# Patient Record
Sex: FEMALE | Race: BLACK OR AFRICAN AMERICAN | Employment: FULL TIME | URBAN - METROPOLITAN AREA
[De-identification: names, ages, dates, MRNs, and addresses within clinical notes are randomized per-mention and may not be internally consistent; named-entity substitution may affect disease eponyms.]

---

## 2018-10-19 ENCOUNTER — HOSPITAL ENCOUNTER (OUTPATIENT)
Dept: PHYSICAL THERAPY | Age: 38
Discharge: HOME | End: 2018-10-19
Payer: COMMERCIAL

## 2018-10-19 PROCEDURE — 97110 THERAPEUTIC EXERCISES: CPT

## 2018-10-19 PROCEDURE — 97161 PT EVAL LOW COMPLEX 20 MIN: CPT

## 2018-10-19 NOTE — PROGRESS NOTES
[address] [facility] AT [address] [place] [address] Dr. ISABELL Olguin [address], [place], [address]  Phone: [phone]   [phone] PLAN OF CARE / STATEMENT OF MEDICAL NECESSITY FOR PHYSICAL THERAPY SERVICES Patient Name: Viral Gregory : 1980 Medical  
Diagnosis: Pain in right ankle [M25.571] Treatment Diagnosis: Pain in right ankle [M25.571] Onset Date: 18 Referral Source: Tania Collier [facility]): 10/19/2018 Prior Hospitalization: See medical history Provider #: [id number] Prior Level of Function: Independent w/ ADL's Comorbidities: NA Medications: Verified on Patient Summary List  
The Plan of Care and following information is based on the information from the initial evaluation.  
=========================================================================================== Assessment / key information:  Patient is a 33 y/o female presenting s/p ORIF of the right ankle for fibular fracture. Pt fell on 18 and had ORIF on 18. She progressed from 8 weeks NWB (scooter) and she currently presents in CAM walker which she has been walking in for 6 weeks. 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 She reports some stiffness with moving her ankle, but reports 0/10 pain walking in the boot. GAIT: Pt presents to therapy in right CAM walker. Without boot, pt demonstrates decreased R strides and increased toe out Ankle AROM: Left ankle +5 degrees DF, 70 PF, 50 INV, 10 EV; Right ankle -10 DF, 50 PF, 12 INV, 3 EV 
CIRCUMFERENCE: +3cm differential at malleolar level on right ankle PALPATION: Moderate TTP to right ATF ligament, achilles, distal fibula STRENGTH: 4-/5 isometrically all right ankle movements The patient was instructed in a home exercise program to address the above findings/deficits. The patient should benefit from therapy services to progress toward functional goals and improve quality of life. 
=========================================================================================== History LOW Complexity : Zero comorbidities / personal factors that will impact the outcome / POC; Examination MEDIUM Complexity : 3 Standardized tests and measures addressing body structure, function, activity limitation and / or participation in recreation ; Presentation MEDIUM Complexity : Evolving with changing characteristics ; Decision Making MEDIUM Complexity : FOTO score of 26-74; Complexity LOW Problem List: pain affecting function, decrease ROM, decrease strength, edema affecting function, impaired gait/ balance, decrease ADL/ functional abilitiies, decrease activity tolerance and decrease flexibility/ joint mobility Treatment Plan may include any combination of the following: Therapeutic exercise, Therapeutic activities, Neuromuscular re-education, Physical agent/modality, Gait/balance training, Manual therapy and Patient education Patient / Family readiness to learn indicated by: asking questions, trying to perform skills and interest 
Persons(s) to be included in education: patient (P) Barriers to Learning/Limitations: None Measures taken:   
Patient Goal (s): Full ROM/flexibility, walk without boot Patient self reported health status: excellent Rehabilitation Potential: good ? Short Term Goals: To be accomplished in  6  treatments: Independent/compliant with long term HEP for ROM, strength Improve active dorsiflexion ROM to 0 degrees or greater to improve gait Pt will be able to ambulate on even surface short/med distance with minimal gait deviation ? Long Term Goals: To be accomplished in  12  treatments: 
Pt will report 75% improvement with distance walking on even surface Patient will be able to climb/descend 1 flight of stairs reciprocally with demonstration of good control/safety Able to stand unsupported on involved leg for 15-20 seconds to demonstrate balance and limb stability Improve FOTO outcome score by 19% to indicate a significant improvement in ADL function Frequency / Duration:   Patient to be seen  2  times per week for 6  weeks: 
Patient / Caregiver education and instruction: activity modification, brace/ splint application and exercises G-Codes (GP): NA Therapist Signature: Azalia Tay PT, OCS Date: 10/19/2018 Certification Period: NA Time: 1:24 PM  
=========================================================================================== I certify that the above Physical Therapy Services are being furnished while the patient is under my care. I agree with the treatment plan and certify that this therapy is necessary. Physician Signature:       Date:      Time:  Please sign and return to [facility] or you may fax the signed copy to [phone]. Thank you.

## 2018-10-19 NOTE — PROGRESS NOTES
PT EVALUATION/ DAILY TREATMENT NOTE Patient Name: Ivan Blanco Date:10/19/2018 : 1980 [x]  Patient  Verified Payor: Young Hoff / Plan: [address] PT / Product Type: Commerical / In time:1:25  Out time:2:15 Total Treatment Time (min): 50 Total Timed Codes (min): 50 
1:1 Treatment Time ( [address] only):   
Visit #: 1 of 12 SUBJECTIVE Pain Level (0-10 scale): 0 See Plan of Care for subjective history and objective foot/ankle exam details OBJECTIVE TREATMENT: 
Modality (rationale):  
[]  E-Stim: type _ x _ min     []att   []unatt   []w/US   []w/ice   []w/heat 
[]  Ultrasound: []cont   []pulse    _ W/cm2 x _  min   []1MHz   []3MHz 
[]  Iontophoresis: []take home patch w/ dexamethazone    []40mA   []80mA 
                             []_ mA min w/: []dexamethazone   []other:_ 
[]  Ice pack _  min     [] Hot pack _  min     [] Paraffin _  min 
[]  Other:  
 
Therapeutic Exercise: [x] see flow sheet     [] Other:_ Added/Changed Exercises: 
[x]  Added:See HEP_  to improve (function): Foot/ankle mobility to improve gait 
[]  Changed:_ to improve (function): 
(minutes: 15) Other Objective/Functional Measures:  
Pain Level (0-10 scale) post treatment: 0 
 
ASSESSMENT[x]  See Plan of Care Patient is assigned a low evaluation complexity based upon presence of no comorbidities, FOTO score, and post-operative symptom characteristics. PLAN [x] Other:See Plan of Care_ Chucky Holt 10/19/2018  2:23 PM

## 2018-10-25 ENCOUNTER — HOSPITAL ENCOUNTER (OUTPATIENT)
Dept: PHYSICAL THERAPY | Age: 38
Discharge: HOME | End: 2018-10-25
Payer: COMMERCIAL

## 2018-10-25 PROCEDURE — 97140 MANUAL THERAPY 1/> REGIONS: CPT

## 2018-10-25 PROCEDURE — 97110 THERAPEUTIC EXERCISES: CPT

## 2018-10-25 NOTE — PROGRESS NOTES
PT DAILY TREATMENT NOTE  Patient Name: Faiza Ravi Date:10/25/2018 : 1980 [x]  Patient  Verified Payor: Wilfred Fee / Plan: [address] PT / Product Type: Commerical / In time:10:07  Out time:11:34 Total Treatment Time (min): 87 Total Timed Codes (min): 71 
1:1 Treatment Time (min):   
Visit #: 2 of 12 Treatment Area: Pain in right ankle [M25.571] SUBJECTIVE Pain Level (0-10 scale): 0 Any medication changes, allergies to medications, adverse drug reactions, diagnosis change, or new procedure performed?: [x] No    [] Yes (see summary sheet for update) Subjective functional status/changes:   [] No changes reported I have been trying to do half of my work day in the boot and the other half of the day in my normal shoe because the doctor wants me to try to ween and get away from wearing the boot as soon as I can. OBJECTIVE Modality rationale: decrease inflammation and decrease pain to improve the patients ability to improve functional abilities Type Additional Details  
[] Estim: []Att   []Unatt  []TENS instruct []IFC  []Premod []NMES                       []Other:  []w/US   []w/ice   []w/heat Position: Location:  
[]  Traction: [] Cervical       []Lumbar 
                     [] Prone          []Supine []Intermittent   []Continuous Lbs: 
[] before manual 
[] after manual  
[]  Ultrasound: []Continuous   [] Pulsed []1MHz   []3MHz Location: 
W/cm2:  
[]  Iontophoresis with dexamethasone Location: [] Take home patch  
[] In clinic [x]  Ice     []  heat 
[]  Ice massage Position:long sitting Location:right ankle  
[]  Vasopneumatic Device Pressure: [] lo [] med [] hi  
Temp: [] lo [] med [] hi  
[] Skin assessment post-treatment:  []intact []redness- no adverse reaction 
     []redness  adverse reaction:  
 
 
67 min Therapeutic Exercise:  [x] See flow sheet :  
 Rationale: increase ROM, increase strength, improve balance and increase proprioception to improve the patients ability to improve functional abilities 10 min Manual Therapy:  Retrograde massage/tissue mobs to right foot/ankle, ankle PROM in all planes and manual calf/heel cord stretching Rationale: increase ROM and increase tissue extensibility to improve functional mobility  
   
 min Patient Education: [x] Review HEP    [] Progressed/Changed HEP based on:  
[] positioning   [] body mechanics   [] transfers   [] heat/ice application Other Objective/Functional Measures:  
 
Pain Level (0-10 scale) post treatment: 0 
 
ASSESSMENT/Changes in Function: Pt tolerated all new therex well upon trial today with chief c/o limited inversion/eversion mobility which was apparent during therex today. Pt had the most difficulty with theraband inversion/eversion as well as with AAROM with seated BAPS board today. Pt also needs work on intrinsic foot mobility and digit dexterity. Will continue to progress/advance patient within current POC as tolerated with monitoring symptoms. Patient will continue to benefit from skilled PT services to modify and progress therapeutic interventions, address functional mobility deficits, address ROM deficits, address strength deficits, analyze and address soft tissue restrictions and analyze and cue movement patterns to attain remaining goals. []  See Plan of Care 
[]  See progress note/recertification 
[]  See Discharge Summary Progress towards goals / Updated goals: PLAN [x]  Upgrade activities as tolerated     []  Continue plan of care 
[]  Update interventions per flow sheet      
[]  Discharge due to:_ 
[]  Other:_ Otoniel Correa, INDERJIT 10/25/2018  10:24 AM

## 2018-10-26 ENCOUNTER — HOSPITAL ENCOUNTER (OUTPATIENT)
Dept: PHYSICAL THERAPY | Age: 38
Discharge: HOME | End: 2018-10-26
Payer: COMMERCIAL

## 2018-10-26 PROCEDURE — 97140 MANUAL THERAPY 1/> REGIONS: CPT

## 2018-10-26 PROCEDURE — 97110 THERAPEUTIC EXERCISES: CPT

## 2018-10-26 NOTE — PROGRESS NOTES
PT DAILY TREATMENT NOTE  Patient Name: Joby Pastrana Date:10/26/2018 : 1980 [x]  Patient  Verified Payor: Garry Penny / Plan: [address] PT / Product Type: Commerical / In time:8:50  Out time:9:40 Total Treatment Time (min): 50 Total Timed Codes (min): 50 
1:1 Treatment Time (min):   
Visit #: 3 of 12 Treatment Area: Pain in right ankle [M25.571] SUBJECTIVE Pain Level (0-10 scale): 0 Any medication changes, allergies to medications, adverse drug reactions, diagnosis change, or new procedure performed?: [x] No    [] Yes (see summary sheet for update) Subjective functional status/changes:   [] No changes reported Pt states she does not have pain today, just a cramping sensation periodically. She is going without the walking boot today. OBJECTIVE Modality rationale: Not done due to pt time constraint Min Type Additional Details  
 [] Estim: []Att   []Unatt  []TENS instruct []IFC  []Premod []NMES                       []Other:  []w/US   []w/ice   []w/heat Position: Location:  
 []  Traction: [] Cervical       []Lumbar 
                     [] Prone          []Supine []Intermittent   []Continuous Lbs: 
[] before manual 
[] after manual  
 []  Ultrasound: []Continuous   [] Pulsed []1MHz   []3MHz Location: 
W/cm2:  
 []  Iontophoresis with dexamethasone Location: [] Take home patch  
[] In clinic  
 []  Ice     []  heat 
[]  Ice massage Position: Location:  
 []  Vasopneumatic Device Pressure: [] lo [] med [] hi  
Temp: [] lo [] med [] hi  
[] Skin assessment post-treatment:  []intact []redness- no adverse reaction 
     []redness  adverse reaction:  
 
 
40 min Therapeutic Exercise:  [] See flow sheet :  
Rationale: increase ROM, increase strength, improve coordination and increase proprioception to improve the patients ability to perform gait and other dynamic movement activities 10 min Manual Therapy:  PROM of the right ankle in all planes Rationale: increase ROM and increase tissue extensibility to improve the patient's ability to perform gait and other dynamic movement activities Pain Level (0-10 scale) post treatment: 1.5 ASSESSMENT/Changes in Function: Pt exhibits improved overall ankle AROM and active toe mobility today. Added sharpened Romberg balance as well for balance and stability, pt was moderately challenged with this activity. Patient will continue to benefit from skilled PT services to modify and progress therapeutic interventions, address functional mobility deficits, address ROM deficits, address strength deficits and analyze and cue movement patterns to attain remaining goals. PLAN [x]  Upgrade activities as tolerated     []  Continue plan of care 
[]  Update interventions per flow sheet      
[]  Discharge due to:_ 
[]  Other:_ Chucky Borja 10/26/2018  8:03 AM

## 2018-11-01 ENCOUNTER — HOSPITAL ENCOUNTER (OUTPATIENT)
Dept: PHYSICAL THERAPY | Age: 38
Discharge: HOME | End: 2018-11-01
Payer: COMMERCIAL

## 2018-11-01 PROCEDURE — 97140 MANUAL THERAPY 1/> REGIONS: CPT

## 2018-11-01 PROCEDURE — 97110 THERAPEUTIC EXERCISES: CPT

## 2018-11-01 NOTE — PROGRESS NOTES
PT DAILY TREATMENT NOTE  Patient Name: Rama Lomas Date:2018 : 1980 [x]  Patient  Verified Payor: John Led / Plan: [address] [place] [address] Juan PT / Product Type: Commerical / In time:3:25  Out time:4:30 Total Treatment Time (min): 65 Total Timed Codes (min): 65 
1:1 Treatment Time (min): 65 Visit #: 4 of 12 Treatment Area: Pain in right ankle [M25.571] SUBJECTIVE Pain Level (0-10 scale): 2 Any medication changes, allergies to medications, adverse drug reactions, diagnosis change, or new procedure performed?: [x] No    [] Yes (see summary sheet for update) Subjective functional status/changes:   [] No changes reported Pt reports she is frustrated with the increased swelling going back to work, and wearing shoes is difficult. OBJECTIVE Modality rationale: decrease edema and decrease pain to improve the patients ability to perform gait and other dynamic movement activities Type Additional Details  
[] Estim: []Att   []Unatt  []TENS instruct []IFC  []Premod []NMES                       []Other:  []w/US   []w/ice   []w/heat Position: Location:  
[]  Traction: [] Cervical       []Lumbar 
                     [] Prone          []Supine []Intermittent   []Continuous Lbs: 
[] before manual 
[] after manual  
[]  Ultrasound: []Continuous   [] Pulsed []1MHz   []3MHz Location: 
W/cm2:  
[]  Iontophoresis with dexamethasone Location: [] Take home patch  
[] In clinic  
[]  Ice     []  heat 
[]  Ice massage Position: Location:  
[x]  Vasopneumatic Device- R ankle- 10 minutes  
[] Skin assessment post-treatment:  []intact []redness- no adverse reaction 
     []redness  adverse reaction:  
 
 
43 min Therapeutic Exercise:  [] See flow sheet :  
Rationale: increase ROM, increase strength, improve coordination and increase proprioception to improve the patients ability to perform gait and other dynamic movement activities 12 min Manual Therapy:  Right ankle joint PROM and Graston technique to medial and lateral ankle tendons Rationale: increase ROM and increase tissue extensibility to improve the patient's ability to perform gait and other dynamic movement activities Pain Level (0-10 scale) post treatment: 1 ASSESSMENT/Changes in Function: Pt presents with mild increased swelling and antalgic gait today. Explained that this was not unusual due to increased walking and time on feet while being out of the boot. Pt was advanced to standing calf stretches, which should help to further improve ankle push-off and gait quality. Patient will continue to benefit from skilled PT services to modify and progress therapeutic interventions, address ROM deficits, address strength deficits and analyze and cue movement patterns to attain remaining goals. []  See Plan of Care 
[]  See progress note/recertification 
[]  See Discharge Summary PLAN [x]  Upgrade activities as tolerated     []  Continue plan of care 
[]  Update interventions per flow sheet      
[]  Discharge due to:_ 
[]  Other:_ [facility], [place] 11/1/2018  11:04 AM

## 2018-11-02 ENCOUNTER — HOSPITAL ENCOUNTER (OUTPATIENT)
Dept: PHYSICAL THERAPY | Age: 38
Discharge: HOME | End: 2018-11-02
Payer: COMMERCIAL

## 2018-11-02 PROCEDURE — 97110 THERAPEUTIC EXERCISES: CPT

## 2018-11-02 PROCEDURE — 97016 VASOPNEUMATIC DEVICE THERAPY: CPT

## 2018-11-02 PROCEDURE — 97140 MANUAL THERAPY 1/> REGIONS: CPT

## 2018-11-02 NOTE — PROGRESS NOTES
PT DAILY TREATMENT NOTE  Patient Name: Michelle Judge Date:2018 : 1980 [x]  Patient  Verified Payor: Lloyd Bottom / Plan: [address] [place] [address] Rd PT / Product Type: Commerical / In time: 9:26  Out time: 10:31 Total Treatment Time (min): 65 Total Timed Codes (min): 65 
1:1 Treatment Time (min): N/A Visit #: 5 of 12 Treatment Area: Pain in right ankle [M25.571] SUBJECTIVE Pain Level (0-10 scale): 2-3/10 Any medication changes, allergies to medications, adverse drug reactions, diagnosis change, or new procedure performed?: [x] No    [] Yes (see summary sheet for update) Subjective functional status/changes:   [] No changes reported This week I have been up and around a work a lot more and it's been like a balloon, so it's been difficult to walk. He worked most of the inflammation out yesterday but as soon as I left it came back. I got compression stockings last night. The doctor had given me a lace up brace but it was uncomfortable and restricted movement too much and did not fit in shoes. OBJECTIVE Modality rationale: decrease edema and decrease pain to improve the patients ability to perform gait and other dynamic movement activities Min Type Additional Details  
 [] Estim: []Att   []Unatt        []TENS instruct []IFC  []Premod   []NMES []Other:  []w/US   []w/ice   []w/heat Position: Location:  
 []  Traction: [] Cervical       []Lumbar 
                     [] Prone          []Supine []Intermittent   []Continuous Lbs: 
[] before manual 
[] after manual  
 []  Ultrasound: []Continuous   [] Pulsed []1MHz   []3MHz Location: 
W/cm2:  
 []  Iontophoresis with dexamethasone Location: [] Take home patch  
[] In clinic  
 []  Ice     []  heat 
[]  Ice massage Position: Location:  
10 [x]  Vasopneumatic Device Pressure:       [x] lo [] med [] hi  
Temperature: [x] lo [] med [] hi  
 [x] Skin assessment post-treatment:  [x]intact []redness- no adverse reaction 
     []redness  adverse reaction:  
 
45 min Therapeutic Exercise:  [x] See flow sheet :  
Rationale: increase ROM, increase strength, improve coordination and increase proprioception to improve the patients ability to perform gait and other dynamic movement activities 10 min Manual Therapy:  Right ankle joint PROM and DTM/STM to medial and lateral ankle tendons Rationale: increase ROM and increase tissue extensibility to improve the patient's ability to perform gait and other dynamic movement activities With 
 [x] TE 
 [] TA 
 [] neuro 
 [] other: Patient Education: [x] Review HEP [] Progressed/Changed HEP based on:  
[] positioning   [] body mechanics   [] transfers   [x] heat/ice application   
[x] other: RICE  
  
Other Objective/Functional Measures:  
- Patient was 11 minutes late - Progressed to 1 Full Bowl for marble pickup - Progressed to L5 on TG HR/TR 
- Progressed to GTB for ankle TB 4-way - Progressed to YMB with side stepping Pain Level (0-10 scale) post treatment: 0-1/10 ASSESSMENT/Changes in Function: Pt presents with continued mild swelling today. She tolerated progression of therapeutic exercise well without increase in pain or swelling. Patient educated on RICE on a consistent basis to aid with decreasing swelling especially since she reports increased ambulation this week. Patient will continue to benefit from skilled PT services to modify and progress therapeutic interventions, address ROM deficits, address strength deficits and analyze and cue movement patterns to attain remaining goals. [x]  See Plan of Care 
[]  See progress note/recertification 
[]  See Discharge Summary Progress towards goals / Updated goals: · Short Term Goals: To be accomplished in  6  treatments: Independent/compliant with long term HEP for ROM, strength. MET 11/2/18 Improve active dorsiflexion ROM to 0 degrees or greater to improve gait Pt will be able to ambulate on even surface short/med distance with minimal gait deviation · Long Term Goals: To be accomplished in  12  treatments: 
Pt will report 75% improvement with distance walking on even surface Patient will be able to climb/descend 1 flight of stairs reciprocally with demonstration of good control/safety Able to stand unsupported on involved leg for 15-20 seconds to demonstrate balance and limb stability Improve FOTO outcome score by 19% to indicate a significant improvement in ADL function PLAN [x]  Upgrade activities as tolerated     [x]  Continue plan of care 
[]  Update interventions per flow sheet      
[]  Discharge due to:_ 
[]  Other:_   
 
KAITLIN Steel 11/2/2018  10:31 AM

## 2018-11-05 ENCOUNTER — HOSPITAL ENCOUNTER (OUTPATIENT)
Dept: PHYSICAL THERAPY | Age: 38
Discharge: HOME | End: 2018-11-05
Payer: COMMERCIAL

## 2018-11-05 PROCEDURE — 97016 VASOPNEUMATIC DEVICE THERAPY: CPT

## 2018-11-05 PROCEDURE — 97110 THERAPEUTIC EXERCISES: CPT

## 2018-11-05 PROCEDURE — 97140 MANUAL THERAPY 1/> REGIONS: CPT

## 2018-11-05 NOTE — PROGRESS NOTES
PT DAILY TREATMENT NOTE  Patient Name: Fatoumata Baptiste Date:2018 : 1980 [x]  Patient  Verified Payor: Devi Turner / Plan: [address] [place] [address] Rd PT / Product Type: Commerical / In time:10:05  Out time:11:23 Total Treatment Time (min): 78 Total Timed Codes (min): 62 
1:1 Treatment Time (min):   
Visit #: 6 of 12 Treatment Area: Pain in right ankle [M25.571] SUBJECTIVE Pain Level (0-10 scale): 2-3/10 Any medication changes, allergies to medications, adverse drug reactions, diagnosis change, or new procedure performed?: [x] No    [] Yes (see summary sheet for update) Subjective functional status/changes:   [] No changes reported I have been wearing a compression stocking for the past couple of days which has helped control some of the swelling, but it hasn't really helped with the pain that I feel on the inside of my ankle. OBJECTIVE Modality rationale: decrease edema, decrease inflammation and decrease pain to improve the patients ability to improve functional abilities Type Additional Details  
[] Estim: []Att   []Unatt  []TENS instruct []IFC  []Premod []NMES                       []Other:  []w/US   []w/ice   []w/heat Position: Location:  
[]  Traction: [] Cervical       []Lumbar 
                     [] Prone          []Supine []Intermittent   []Continuous Lbs: 
[] before manual 
[] after manual  
[]  Ultrasound: []Continuous   [] Pulsed []1MHz   []3MHz Location: 
W/cm2:  
[]  Iontophoresis with dexamethasone Location: [] Take home patch  
[] In clinic  
[]  Ice     []  heat 
[]  Ice massage Position: Location:  
[x]  Vasopneumatic Device Pressure: [x] lo [] med [] hi  
Temp: [x] lo [] med [] hi  
[] Skin assessment post-treatment:  []intact []redness- no adverse reaction 
     []redness  adverse reaction:  
 
 
58 min Therapeutic Exercise:  [x] See flow sheet :  
 Rationale: increase ROM, increase strength, improve balance and increase proprioception to improve the patients ability to improve functional abilities 10 min Manual Therapy:  Retrograde massage/tissue mobs to right foot/ankle Rationale: increase ROM and increase tissue extensibility to improve functional mobility 
       
 min Patient Education: [x] Review HEP    [] Progressed/Changed HEP based on:  
[] positioning   [] body mechanics   [] transfers   [] heat/ice application Other Objective/Functional Measures:  
 
Pain Level (0-10 scale) post treatment: 0 
 
ASSESSMENT/Changes in Function: Patient reports approximately 30% overall improvement from therapy since initial evaluation. Pt was also able to tolerate increased resistance with mini band side steps with min to mod challenge today. Will continue to progress/advance patient within current POC as tolerated with monitoring symptoms. Patient will continue to benefit from skilled PT services to modify and progress therapeutic interventions, address functional mobility deficits, address ROM deficits, address strength deficits, analyze and address soft tissue restrictions and analyze and cue movement patterns to attain remaining goals. []  See Plan of Care 
[]  See progress note/recertification 
[]  See Discharge Summary Progress towards goals / Updated goals: PLAN [x]  Upgrade activities as tolerated     []  Continue plan of care 
[]  Update interventions per flow sheet      
[]  Discharge due to:_ 
[]  Other:_ Marquis Fournier, INDERJIT 11/5/2018  10:08 AM

## 2018-11-08 ENCOUNTER — HOSPITAL ENCOUNTER (OUTPATIENT)
Dept: PHYSICAL THERAPY | Age: 38
Discharge: HOME | End: 2018-11-08
Payer: COMMERCIAL

## 2018-11-08 PROCEDURE — 97140 MANUAL THERAPY 1/> REGIONS: CPT

## 2018-11-08 PROCEDURE — 97110 THERAPEUTIC EXERCISES: CPT

## 2018-11-08 NOTE — PROGRESS NOTES
PT DAILY TREATMENT NOTE  Patient Name: Adrian Hester Date:2018 : 1980 [x]  Patient  Verified Payor: Kim Randolph / Plan: [address] [place] [address] Rd PT / Product Type: Commerical / In time:5:00  Out time:6:00 Total Treatment Time (min): 60 Total Timed Codes (min): 60 
1:1 Treatment Time (min):   
Visit #: 7 of 12 Treatment Area: Pain in right ankle [M25.571] SUBJECTIVE Pain Level (0-10 scale): 0 Any medication changes, allergies to medications, adverse drug reactions, diagnosis change, or new procedure performed?: [x] No    [] Yes (see summary sheet for update) Subjective functional status/changes:   [] No changes reported Pt reports her ankle is doing a lot better this week swelling-wise, and also she got an ASO brace which seems to be helping too. OBJECTIVE Modality rationale: decrease edema and decrease pain to improve the patients ability to perform gait and other dynamic movement activities Min Type Additional Details  
 [] Estim: []Att   []Unatt  []TENS instruct []IFC  []Premod []NMES                       []Other:  []w/US   []w/ice   []w/heat Position: Location:  
 []  Traction: [] Cervical       []Lumbar 
                     [] Prone          []Supine []Intermittent   []Continuous Lbs: 
[] before manual 
[] after manual  
 []  Ultrasound: []Continuous   [] Pulsed []1MHz   []3MHz Location: 
W/cm2:  
 []  Iontophoresis with dexamethasone Location: [] Take home patch  
[] In clinic  
 []  Ice     []  heat 
[]  Ice massage Position: Location:  
10 [x]  Vasopneumatic Device Pressure: [] lo [x] med [] hi  
Temp: [x] lo [] med [] hi  
[] Skin assessment post-treatment:  []intact []redness- no adverse reaction 
     []redness  adverse reaction:  
 
 
40 min Therapeutic Exercise:  [] See flow sheet :  
Rationale: increase ROM, increase strength, improve balance and increase proprioception to improve the patients ability to perform gait and other dynamic movement activities 10 min Manual Therapy: Ankle PROM in all directions, Instrument assisted soft tissue mobilization applied to med/lat ankle using GT-3 and GT-4 instruments Rationale: increase ROM and increase tissue extensibility to improve gait mechanics Pain Level (0-10 scale) post treatment: 0 
 
ASSESSMENT/Changes in Function: Pt presented with moderate decrease in swelling today. Still exhibiting moderate gait deficit on even surface. Reviewed self PROM to ankle in sitting position for Patient will continue to benefit from skilled PT services to address functional mobility deficits, address ROM deficits, address strength deficits and analyze and cue movement patterns to attain remaining goals. PLAN 
[]  Upgrade activities as tolerated     []  Continue plan of care 
[]  Update interventions per flow sheet      
[]  Discharge due to:_ 
[]  Other:_ Chucky Clay 11/8/2018  6:11 PM

## 2018-11-15 ENCOUNTER — HOSPITAL ENCOUNTER (OUTPATIENT)
Dept: PHYSICAL THERAPY | Age: 38
Discharge: HOME | End: 2018-11-15
Payer: COMMERCIAL

## 2018-11-15 PROCEDURE — 97110 THERAPEUTIC EXERCISES: CPT

## 2018-11-15 PROCEDURE — 97140 MANUAL THERAPY 1/> REGIONS: CPT

## 2018-11-15 NOTE — PROGRESS NOTES
PT DAILY TREATMENT NOTE  Patient Name: Kishan Richardson Date:11/15/2018 : 1980 [x]  Patient  Verified Payor: Steve Tian / Plan: [address] [place] [address] Rd PT / Product Type: Commerical / In time:9:26  Out time:10:47 Total Treatment Time (min): 81 Total Timed Codes (min): 65 
1:1 Treatment Time (min):   
Visit #: 8 of 12 Treatment Area: Pain in right ankle [M25.571] SUBJECTIVE Pain Level (0-10 scale): 0 Any medication changes, allergies to medications, adverse drug reactions, diagnosis change, or new procedure performed?: [x] No    [] Yes (see summary sheet for update) Subjective functional status/changes:   [] No changes reported I don't really have any pain in my ankle, it just feels stiff especially when I am bending over my foot when I am walking. OBJECTIVE Modality rationale: decrease inflammation and decrease pain to improve the patients ability to improve functional abilities Min Type Additional Details  
 [] Estim: []Att   []Unatt  []TENS instruct []IFC  []Premod []NMES                       []Other:  []w/US   []w/ice   []w/heat Position: Location:  
 []  Traction: [] Cervical       []Lumbar 
                     [] Prone          []Supine []Intermittent   []Continuous Lbs: 
[] before manual 
[] after manual  
 []  Ultrasound: []Continuous   [] Pulsed []1MHz   []3MHz Location: 
W/cm2:  
 []  Iontophoresis with dexamethasone Location: [] Take home patch  
[] In clinic [x]  Ice     []  heat 
[]  Ice massage Position:long sitting Location:left ankle  
 []  Vasopneumatic Device Pressure: [] lo [] med [] hi  
Temp: [] lo [] med [] hi  
[] Skin assessment post-treatment:  []intact []redness- no adverse reaction 
     []redness  adverse reaction:  
 
 
61 min Therapeutic Exercise:  [x] See flow sheet :  
Rationale: increase ROM, increase strength, improve balance and increase proprioception to improve the patients ability to improve functional abilities 10 min Manual Therapy:  Retrograde massage/tissue mobs to right foot/ankle Rationale: increase ROM and increase tissue extensibility to improve functional mobility  
       
 min Patient Education: [x] Review HEP    [] Progressed/Changed HEP based on:  
[] positioning   [] body mechanics   [] transfers   [] heat/ice application Other Objective/Functional Measures:  
 
Pain Level (0-10 scale) post treatment: 0 
 
ASSESSMENT/Changes in Function: Pt presented with chief c/o limited active dorsiflexion AROM with ambulation which was addressed with addition of step dorsiflexion stretches today. Pt was also able to advance to addition of SLS on level surface and STAR taps as well as increasing to level 2 with standing BAPS exercises with min to mod challenge with proprioceptive/balance awareness today. Will continue to progress/advance patient within current POC as tolerated with monitoring symptoms. Patient will continue to benefit from skilled PT services to modify and progress therapeutic interventions, address functional mobility deficits, address ROM deficits, address strength deficits, analyze and address soft tissue restrictions and analyze and cue movement patterns to attain remaining goals. []  See Plan of Care 
[]  See progress note/recertification 
[]  See Discharge Summary Progress towards goals / Updated goals: PLAN [x]  Upgrade activities as tolerated     []  Continue plan of care 
[]  Update interventions per flow sheet      
[]  Discharge due to:_ 
[]  Other:_ Bairon Bella PTA 11/15/2018  9:31 AM

## 2018-11-19 ENCOUNTER — HOSPITAL ENCOUNTER (OUTPATIENT)
Dept: PHYSICAL THERAPY | Age: 38
Discharge: HOME | End: 2018-11-19
Payer: COMMERCIAL

## 2018-11-19 PROCEDURE — 97140 MANUAL THERAPY 1/> REGIONS: CPT

## 2018-11-19 PROCEDURE — 97110 THERAPEUTIC EXERCISES: CPT

## 2018-11-19 NOTE — PROGRESS NOTES
[facility] AT [address] [place] [address] Dr. IS[address]ELL [address], [place], [address] [place] [address] Road  Phone: [phone]   [phone] PROGRESS NOTE Patient Name: Gigi New : 1980 Treatment/Medical Diagnosis: Pain in right ankle [M25.571] Referral Source: Ollie Bang DPM    
Date of Initial Visit: 10/19/18 Attended Visits: 9 Missed Visits: 1 SUMMARY OF TREATMENT Gait training, therapeutic exercise for ROM/strength, balance/proprioceptive training, manual therapy to right foot/ankle, pain modalities, HEP 
CURRENT STATUS Patient has attended 9 therapy sessions s/p right ankle ORIF and reports 60% improvement. She reports her pain, gait and swelling have all improved. She reports average pain ranging between 0-2/10. Remaining functional deficits include difficulty descending stairs without relying on railing, difficulty wearing low cut shoes, and inability to wear heeled shoes. Right ankle AROM: +2 degrees DF, 50 degrees PF, 18 degrees INV, 8 degrees EV Strength: 4/5 INV/EV, 4-/5 PF, 4+/5 DF · Short Term Goals: To be accomplished in  6  treatments: Independent/compliant with long term HEP for ROM, strength- Met and ongoing Improve active dorsiflexion ROM to 0 degrees or greater to improve gait- Met, +2 degrees Pt will be able to ambulate on even surface short/med distance with minimal gait deviation- Met, minimal deviation noted · Long Term Goals: To be accomplished in  12  treatments: 
Pt will report 75% improvement with distance walking on even surface- Not met, progressing Patient will be able to climb/descend 1 flight of stairs reciprocally with demonstration of good control/safety- Progressing, eccentric control deficits with descending stairs Able to stand unsupported on involved leg for 15-20 seconds to demonstrate balance and limb stability- Met at least 50% of the time Improve FOTO outcome score by 19% to indicate a significant improvement in ADL function- Not met, improved 11% so far RECOMMENDATIONS Continue therapy 2x/week for 4 weeks to progress toward the above functional goals. If you have any questions/comments please contact us directly at [phone]. Thank you for allowing us to assist in the care of your patient. Therapist Signature: Kristine Mireles PT, SAHIL Date: 11/19/2018 Time: 2:42 PM  
NOTE TO PHYSICIAN:  PLEASE COMPLETE THE ORDERS BELOW AND [facility] UNIT: [phone]. If you are unable to process this request in 24 hours please contact our office: [phone]. 
 
___ I have read the above report and request that my patient continue as recommended.  
___ I have read the above report and request that my patient continue therapy with the following changes/special instructions:_________________________________________________________  
___ I have read the above report and request that my patient be discharged from therapy.   
 
Physician Signature:       Date:      Time:

## 2018-11-19 NOTE — PROGRESS NOTES
PT DAILY TREATMENT NOTE  Patient Name: Meng Patch Date:2018 : 1980 [x]  Patient  Verified Payor: Christen Villa / Plan: [address] [place] [address] Rd PT / Product Type: Commerical / In time:5:07  Out time:6:03 Total Treatment Time (min): 56 Total Timed Codes (min): 56 
1:1 Treatment Time (min):   
Visit #: 9 of 12 Treatment Area: Pain in right ankle [M25.571] SUBJECTIVE Pain Level (0-10 scale): 0 Any medication changes, allergies to medications, adverse drug reactions, diagnosis change, or new procedure performed?: [x] No    [] Yes (see summary sheet for update) Subjective functional status/changes:   [] No changes reported See Progress Note OBJECTIVE Modality rationale: Pt deferred Type Additional Details  
[] Estim: []Att   []Unatt        []TENS instruct []IFC  []Premod   []NMES []Other:  []w/US   []w/ice   []w/heat Position: Location:  
[]  Traction: [] Cervical       []Lumbar 
                     [] Prone          []Supine []Intermittent   []Continuous Lbs: 
[] before manual 
[] after manual  
[]  Ultrasound: []Continuous   [] Pulsed []1MHz   []3MHz Location: 
W/cm2:  
[]  Iontophoresis with dexamethasone Location: [] Take home patch  
[] In clinic  
[]  Ice     []  heat 
[]  Ice massage Position: Location:  
[]  Vasopneumatic Device Pressure:       [] lo [] med [] hi  
Temperature: [] lo [] med [] hi  
[] Skin assessment post-treatment:  []intact []redness- no adverse reaction 
     []redness  adverse reaction:  
 
 
46 min Therapeutic Exercise:  [] See flow sheet :  
Rationale: increase ROM, increase strength, improve balance and increase proprioception to improve the patients ability to perform gait and other dynamic movement activities 10 min Manual Therapy:  Graston therapy to right achilles, PROM of the ankle and 1st toe Rationale: increase ROM and increase tissue extensibility to improve the patient's ability to perform gait and other dynamic movement activities Pain Level (0-10 scale) post treatment: 1 ASSESSMENT/Changes in Function:  
Patient will continue to benefit from skilled PT services to address functional mobility deficits, address ROM deficits, address strength deficits and analyze and cue movement patterns to attain remaining goals. []  See Plan of Care [x]  See progress note/recertification 
[]  See Discharge Summary PLAN 
[]  Upgrade activities as tolerated     [x]  Continue plan of care 
[]  Update interventions per flow sheet      
[]  Discharge due to:_ 
[]  Other:_ Chucky Shine 11/19/2018  3:03 PM

## 2018-11-21 ENCOUNTER — HOSPITAL ENCOUNTER (OUTPATIENT)
Dept: PHYSICAL THERAPY | Age: 38
Discharge: HOME | End: 2018-11-21
Payer: COMMERCIAL

## 2018-11-21 PROCEDURE — 97110 THERAPEUTIC EXERCISES: CPT

## 2018-11-21 NOTE — PROGRESS NOTES
PT DAILY TREATMENT NOTE  Patient Name: Joby Pastrana Date:2018 : 1980 [x]  Patient  Verified Payor: Garry Penny / Plan: [address] [place] [address] Rd PT / Product Type: Commerical / In time:10:38  Out time:11:30 Total Treatment Time (min): 52 Total Timed Codes (min): 36 
1:1 Treatment Time (min):   
Visit #: 10 of 12 Treatment Area: Pain in right ankle [M25.571] SUBJECTIVE Pain Level (0-10 scale): 0 Any medication changes, allergies to medications, adverse drug reactions, diagnosis change, or new procedure performed?: [x] No    [] Yes (see summary sheet for update) Subjective functional status/changes:   [] No changes reported My ankle is stiff, but I really don't have any pain in it today. OBJECTIVE Modality rationale: decrease inflammation and decrease pain to improve the patients ability to improve functional abilities Type Additional Details  
[] Estim: []Att   []Unatt  []TENS instruct []IFC  []Premod []NMES                       []Other:  []w/US   []w/ice   []w/heat Position: Location:  
[]  Traction: [] Cervical       []Lumbar 
                     [] Prone          []Supine []Intermittent   []Continuous Lbs: 
[] before manual 
[] after manual  
[]  Ultrasound: []Continuous   [] Pulsed []1MHz   []3MHz Location: 
W/cm2:  
[]  Iontophoresis with dexamethasone Location: [] Take home patch  
[] In clinic [x]  Ice     []  heat 
[]  Ice massage Position:long sitting Location:right ankle/foot  
[]  Vasopneumatic Device Pressure: [] lo [] med [] hi  
Temp: [] lo [] med [] hi  
[] Skin assessment post-treatment:  []intact []redness- no adverse reaction 
     []redness  adverse reaction:  
 
 
42 min Therapeutic Exercise:  [x] See flow sheet :  
Rationale: increase ROM, increase strength, improve balance and increase proprioception to improve the patients ability to improve functional abilities min Patient Education: [x] Review HEP    [] Progressed/Changed HEP based on:  
[] positioning   [] body mechanics   [] transfers   [] heat/ice application Other Objective/Functional Measures:  
 
Pain Level (0-10 scale) post treatment: 0 
 
ASSESSMENT/Changes in Function: Pt presents with the most functional improvement with decreased reported edema with prolonged standing ADL's since last tx. Pt was also able to tolerate increased resistance with mini band side steps with min to mod challenge today. Will continue to progress/advance patient within current POC as tolerated with monitoring symptoms. Patient will continue to benefit from skilled PT services to modify and progress therapeutic interventions, address functional mobility deficits, address ROM deficits, address strength deficits and analyze and cue movement patterns to attain remaining goals. []  See Plan of Care 
[]  See progress note/recertification 
[]  See Discharge Summary Progress towards goals / Updated goals: PLAN [x]  Upgrade activities as tolerated     []  Continue plan of care 
[]  Update interventions per flow sheet      
[]  Discharge due to:_ 
[]  Other:_ Boby Márquez PTA 11/21/2018  10:40 AM

## 2018-11-27 ENCOUNTER — HOSPITAL ENCOUNTER (OUTPATIENT)
Dept: PHYSICAL THERAPY | Age: 38
Discharge: HOME | End: 2018-11-27
Payer: COMMERCIAL

## 2018-11-27 PROCEDURE — 97140 MANUAL THERAPY 1/> REGIONS: CPT

## 2018-11-27 PROCEDURE — 97110 THERAPEUTIC EXERCISES: CPT

## 2018-11-27 NOTE — PROGRESS NOTES
PT DAILY TREATMENT NOTE  Patient Name: Cinthia Bueno Date:2018 : 1980 [x]  Patient  Verified Payor: Mima Rosario / [address] / Product Type: Commerical / In time:9:21  Out time:10:19 Total Treatment Time (min): 58 Total Timed Codes (min): 52 
1:1 Treatment Time (min):   
Visit #: 11 of 12 Treatment Area: Pain in right ankle [M25.571] SUBJECTIVE Pain Level (0-10 scale): 0 Any medication changes, allergies to medications, adverse drug reactions, diagnosis change, or new procedure performed?: [x] No    [] Yes (see summary sheet for update) Subjective functional status/changes:   [] No changes reported My ankle has mainly just been stiff lately, but I was poking around on my ankle over the weekend and I could feel one of the screws which freaked me out a little bit. OBJECTIVE 48 min Therapeutic Exercise:  [x] See flow sheet :  
Rationale: increase ROM, increase strength, improve balance and increase proprioception to improve the patients ability to improve functional abilities 10 min Manual Therapy:  Instrument assisted soft tissue mobilization applied to dorsal foot using GT-3, great toe and metatarsal joint mobs Rationale: increase ROM and increase tissue extensibility to improve functional mobility  
    
 min Patient Education: [x] Review HEP    [] Progressed/Changed HEP based on:  
[] positioning   [] body mechanics   [] transfers   [] heat/ice application Other Objective/Functional Measures:  
 
Pain Level (0-10 scale) post treatment: 0 
 
ASSESSMENT/Changes in Function: Pt presented with a palpable screw head near the distal region of incision today, which she was told to keep an eye on. Pt although presented with fairly good talocrural A>P mobility with addition of mobilization with manual intervention today. Will continue to progress/advance patient within current POC as tolerated with monitoring symptoms. Patient will continue to benefit from skilled PT services to modify and progress therapeutic interventions, address functional mobility deficits, address ROM deficits, address strength deficits, analyze and address soft tissue restrictions and analyze and cue movement patterns to attain remaining goals. []  See Plan of Care 
[]  See progress note/recertification 
[]  See Discharge Summary Progress towards goals / Updated goals: PLAN [x]  Upgrade activities as tolerated     []  Continue plan of care 
[]  Update interventions per flow sheet      
[]  Discharge due to:_ 
[]  Other:_ Susan Torrez PTA 11/27/2018  9:24 AM

## 2018-11-30 ENCOUNTER — HOSPITAL ENCOUNTER (OUTPATIENT)
Dept: PHYSICAL THERAPY | Age: 38
Discharge: HOME | End: 2018-11-30
Payer: COMMERCIAL

## 2018-11-30 PROCEDURE — 97140 MANUAL THERAPY 1/> REGIONS: CPT

## 2018-11-30 PROCEDURE — 97110 THERAPEUTIC EXERCISES: CPT

## 2018-11-30 NOTE — PROGRESS NOTES
PT DAILY TREATMENT NOTE  Patient Name: Cinthia Bueno Date:2018 : 1980 [x]  Patient  Verified Payor: Mima Rosario / Plan: [address] [place] [address] Juan PT / Product Type: Commerical / In time:10:33  Out time:11:38 Total Treatment Time (min): 65 Total Timed Codes (min): 55 
1:1 Treatment Time (min):   
Visit #: 12 of 16 Treatment Area: Pain in right ankle [M25.571] SUBJECTIVE Pain Level (0-10 scale): 0 Any medication changes, allergies to medications, adverse drug reactions, diagnosis change, or new procedure performed?: [x] No    [] Yes (see summary sheet for update) Subjective functional status/changes:   [] No changes reported See Progress Note OBJECTIVE Modality rationale: decrease edema and decrease pain to improve the patients ability to perform gait and other dynamic movement activities Min Type Additional Details  
 [] Estim: []Att   []Unatt        []TENS instruct []IFC  []Premod   []NMES []Other:  []w/US   []w/ice   []w/heat Position: Location:  
 []  Traction: [] Cervical       []Lumbar 
                     [] Prone          []Supine []Intermittent   []Continuous Lbs: 
[] before manual 
[] after manual  
 []  Ultrasound: []Continuous   [] Pulsed []1MHz   []3MHz Location: 
W/cm2:  
 []  Iontophoresis with dexamethasone Location: [] Take home patch  
[] In clinic  
10 [x]  Ice     []  heat 
[]  Ice massage Position: Seated Location: Right ankle  
 []  Vasopneumatic Device Pressure:       [] lo [] med [] hi  
Temperature: [] lo [] med [] hi  
[] Skin assessment post-treatment:  []intact []redness- no adverse reaction 
     []redness  adverse reaction:  
 
 
45 min Therapeutic Exercise:  [] See flow sheet :  
Rationale: increase ROM, increase strength, improve balance and increase proprioception to improve the patients ability to perform gait and other dynamic movement activities 10 min Manual Therapy:  Gr 3 right ankle DF mob's, PROM for inversion Rationale: increase ROM and increase tissue extensibility to improve the patient's ability to perform gait and other dynamic movement activities Pain Level (0-10 scale) post treatment: 0 
 
ASSESSMENT/Changes in Function:  
Patient will continue to benefit from skilled PT services to modify and progress therapeutic interventions, address functional mobility deficits, address strength deficits and analyze and cue movement patterns to attain remaining goals. []  See Plan of Care [x]  See progress note/recertification 
[]  See Discharge Summary PLAN 
[]  Upgrade activities as tolerated     [x]  Continue plan of care 
[]  Update interventions per flow sheet      
[]  Discharge due to:_ 
[]  Other:_ Chucky Daniels 11/30/2018  11:23 AM

## 2018-11-30 NOTE — PROGRESS NOTES
[facility] AT [address] [place] [address] Dr. ISABELL [address], [place], [address] [place] [address] Road  Phone: [phone]   [phone] PROGRESS NOTE Patient Name: Chaparro Gramajo : 1980 Treatment/Medical Diagnosis: Pain in right ankle [M25.571] Referral Source: Jessica Parks DPM    
Date of Initial Visit: 10/19/18 Attended Visits: 12 Missed Visits: 1 SUMMARY OF TREATMENT Gait training, therapeutic exercise for ROM/strength, balance/proprioceptive training, manual therapy to right foot/ankle, pain modalities, HEP 
 
 
CURRENT STATUS Patient is progressing well with therapy, and reports nearly 70% improvement. Pain level is generally 0-2/10 with ADL's, but she reports increased swelling and limping with standing/walking at work for several hours. She also has some strength deficits with descending stairs, and cannot wear any heeled shoes at work. Pt is progressing with strength, but is unable to perform a heel raise on the involved leg alone. Right ankle AROM: +4 degrees DF, 50 degrees PF, 20 degrees INV, 8 degrees EV Strength: 4/5 INV/EV, 4-/5 PF, 4+/5 DF · Short Term Goals: To be accomplished in  6  treatments: Independent/compliant with long term HEP for ROM, strength- Met and ongoing Improve active dorsiflexion ROM to 0 degrees or greater to improve gait- Met, +4 degrees Pt will be able to ambulate on even surface short/med distance with minimal gait deviation- Met · Long Term Goals: To be accomplished in  12  treatments: 
Pt will report 75% improvement with distance walking on even surface- Not met, progressing Patient will be able to climb/descend 1 flight of stairs reciprocally with demonstration of good control/safety- Progressing, eccentric control deficits with descending stairs Able to stand unsupported on involved leg for 15-20 seconds to demonstrate balance and limb stability- Met  
 Improve FOTO outcome score by 19% to indicate a significant improvement in ADL function- Not met, improved 11% so far RECOMMENDATIONS Continue physical therapy, tapering to 1x/week for 4 weeks. If you have any questions/comments please contact us directly at [phone]. Thank you for allowing us to assist in the care of your patient. Therapist Signature: Dewey Mann PT, SAHIL Date: 11/30/2018 Time: 1:16 PM  
NOTE TO PHYSICIAN:  PLEASE COMPLETE THE ORDERS BELOW AND [facility] UNIT: [phone]. If you are unable to process this request in 24 hours please contact our office: [phone]. 
 
___ I have read the above report and request that my patient continue as recommended.  
___ I have read the above report and request that my patient continue therapy with the following changes/special instructions:_________________________________________________________  
___ I have read the above report and request that my patient be discharged from therapy.   
 
Physician Signature:       Date:      Time:

## 2018-12-07 ENCOUNTER — HOSPITAL ENCOUNTER (OUTPATIENT)
Dept: PHYSICAL THERAPY | Age: 38
Discharge: HOME | End: 2018-12-07
Payer: COMMERCIAL

## 2018-12-07 PROCEDURE — 97110 THERAPEUTIC EXERCISES: CPT

## 2018-12-07 NOTE — PROGRESS NOTES
PT DAILY TREATMENT NOTE  Patient Name: Argenis Gaspar Date:2018 : 1980 [x]  Patient  Verified Payor: Gavi Bowles / Plan: [address] PT / Product Type: Commerical / In time:2:23  Out time:3:03 Total Treatment Time (min): 40 Total Timed Codes (min): 34 
1:1 Treatment Time (min):   
Visit #: 13 of 16 Treatment Area: Pain in right ankle [M25.571] SUBJECTIVE Pain Level (0-10 scale): 0 Any medication changes, allergies to medications, adverse drug reactions, diagnosis change, or new procedure performed?: [x] No    [] Yes (see summary sheet for update) Subjective functional status/changes:   [] No changes reported I feel like I can bend my ankle better and I feel like I am even walking better even going down the hallways at school than I have in a while. OBJECTIVE 40 min Therapeutic Exercise:  [x] See flow sheet :  
Rationale: increase ROM, increase strength, improve balance and increase proprioception to improve the patients ability to improve functional abilities 
       
 min Patient Education: [x] Review HEP    [] Progressed/Changed HEP based on:  
[] positioning   [] body mechanics   [] transfers   [] heat/ice application Other Objective/Functional Measures:  
 
Pain Level (0-10 scale) post treatment: 0 
 
ASSESSMENT/Changes in Function: Pt presents with the most functional improvement with improved prolonged gait tolerance/endurance as well as improved active dorsiflexion AROM. Pt did present with new finding of increased soleus tension which was addressed with addition of self stretching today. Will continue to progress/advance patient within current POC as tolerated with monitoring symptoms. Patient will continue to benefit from skilled PT services to modify and progress therapeutic interventions, address functional mobility deficits, address ROM deficits, address strength deficits and analyze and cue movement patterns to attain remaining goals. []  See Plan of Care 
[]  See progress note/recertification 
[]  See Discharge Summary Progress towards goals / Updated goals: PLAN [x]  Upgrade activities as tolerated     []  Continue plan of care 
[]  Update interventions per flow sheet      
[]  Discharge due to:_ 
[]  Other:_ Jacinda Ferguson, INDERJIT 12/7/2018  2:29 PM

## 2018-12-13 ENCOUNTER — HOSPITAL ENCOUNTER (OUTPATIENT)
Dept: PHYSICAL THERAPY | Age: 38
Discharge: HOME | End: 2018-12-13
Payer: COMMERCIAL

## 2018-12-13 PROCEDURE — 97110 THERAPEUTIC EXERCISES: CPT

## 2018-12-13 NOTE — PROGRESS NOTES
PT DAILY TREATMENT NOTE     Patient Name: Rama Lomas  Date:2018  : 1980  [x]  Patient  Verified  Payor: John Led / Plan: [employer] PT / Product Type: Commerical /    In time:8:53  Out time:9:52  Total Treatment Time (min): 59  Total Timed Codes (min): 43  1:1 Treatment Time (min):    Visit #: 14 of 16    Treatment Area: Pain in right ankle [M25.571]    SUBJECTIVE  Pain Level (0-10 scale): 1/10  Any medication changes, allergies to medications, adverse drug reactions, diagnosis change, or new procedure performed?: [x] No    [] Yes (see summary sheet for update)  Subjective functional status/changes:   [] No changes reported  I have been feeling more of an nagging/aching type of pain on the outside of my ankle and bottom of my foot in my arch area lately.     OBJECTIVE  Modality rationale: decrease inflammation and decrease pain to improve the patients ability to improve functional abilities   Min Type Additional Details    [] Estim: []Att   []Unatt  []TENS instruct                 []IFC  []Premod []NMES                       []Other:  []w/US   []w/ice   []w/heat  Position:  Location:    []  Traction: [] Cervical       []Lumbar                       [] Prone          []Supine                       []Intermittent   []Continuous Lbs:  [] before manual  [] after manual    []  Ultrasound: []Continuous   [] Pulsed                           []1MHz   []3MHz Location:  W/cm2:    []  Iontophoresis with dexamethasone         Location: [] Take home patch   [] In clinic    [x]  Ice     []  heat  []  Ice massage Position:long sitting   Location:right ankle    []  Vasopneumatic Device Pressure: [] lo [] med [] hi   Temp: [] lo [] med [] hi   [] Skin assessment post-treatment:  []intact []redness- no adverse reaction       []redness  adverse reaction:       49 min Therapeutic Exercise:  [x] See flow sheet :   Rationale: increase ROM, increase strength, improve balance and increase proprioception to improve the patients ability to improve functional abilities            min Patient Education: [x] Review HEP    [] Progressed/Changed HEP based on:   [] positioning   [] body mechanics   [] transfers   [] heat/ice application        Other Objective/Functional Measures:     Pain Level (0-10 scale) post treatment: 0    ASSESSMENT/Changes in Function: Pt presented with chief c/o lateral ankle and arch pain over the past 2 visits. Pt was able to advance to SLS trampoline rebounds on level surface with mod to max challenge with proprioceptive/balance awareness today. Will continue to progress/advance patient within current POC as tolerated with monitoring symptoms. Patient will continue to benefit from skilled PT services to modify and progress therapeutic interventions, address functional mobility deficits, address ROM deficits, address strength deficits and analyze and cue movement patterns to attain remaining goals.      []  See Plan of Care  []  See progress note/recertification  []  See Discharge Summary         Progress towards goals / Updated goals:      PLAN  [x]  Upgrade activities as tolerated     []  Continue plan of care  []  Update interventions per flow sheet       []  Discharge due to:_  []  Other:_      Caridad Curling, INDERJIT 12/13/2018  8:55 AM

## 2018-12-19 ENCOUNTER — APPOINTMENT (OUTPATIENT)
Dept: PHYSICAL THERAPY | Age: 38
End: 2018-12-19
Payer: COMMERCIAL

## 2018-12-21 ENCOUNTER — HOSPITAL ENCOUNTER (OUTPATIENT)
Dept: PHYSICAL THERAPY | Age: 38
Discharge: HOME | End: 2018-12-21
Payer: COMMERCIAL

## 2018-12-21 PROCEDURE — 97110 THERAPEUTIC EXERCISES: CPT

## 2018-12-21 NOTE — PROGRESS NOTES
PT DAILY TREATMENT NOTE     Patient Name: Beatrice Mcmanus  Date:2018  : 1980  [x]  Patient  Verified  Payor: Reji Massey / Plan: [employer]  [facility] PT / Product Type: Commerical /    In time:8:46  Out time:9:46  Total Treatment Time (min): 60  Total Timed Codes (min): 44  1:1 Treatment Time (min):    Visit #: 15 of 16    Treatment Area: Pain in right ankle [M25.571]    SUBJECTIVE  Pain Level (0-10 scale): 1/10  Any medication changes, allergies to medications, adverse drug reactions, diagnosis change, or new procedure performed?: [x] No    [] Yes (see summary sheet for update)  Subjective functional status/changes:   [] No changes reported  My ankle has just been aching a lot lately, I think that it just all of the rain that we have been getting here lately.     OBJECTIVE  Modality rationale: decrease inflammation and decrease pain to improve the patients ability to improve functional abilities   Min Type Additional Details    [] Estim: []Att   []Unatt  []TENS instruct                 []IFC  []Premod []NMES                       []Other:  []w/US   []w/ice   []w/heat  Position:  Location:    []  Traction: [] Cervical       []Lumbar                       [] Prone          []Supine                       []Intermittent   []Continuous Lbs:  [] before manual  [] after manual    []  Ultrasound: []Continuous   [] Pulsed                           []1MHz   []3MHz Location:  W/cm2:    []  Iontophoresis with dexamethasone         Location: [] Take home patch   [] In clinic    [x]  Ice     []  heat  []  Ice massage Position: long sitting   Location:right ankle    []  Vasopneumatic Device Pressure: [] lo [] med [] hi   Temp: [] lo [] med [] hi   [] Skin assessment post-treatment:  []intact []redness- no adverse reaction       []redness  adverse reaction:       50 min Therapeutic Exercise:  [x] See flow sheet :   Rationale: increase ROM, increase strength, improve balance and increase proprioception to improve the patients ability to improve functional abilities           min Patient Education: [x] Review HEP    [] Progressed/Changed HEP based on:   [] positioning   [] body mechanics   [] transfers   [] heat/ice application        Other Objective/Functional Measures:     Pain Level (0-10 scale) post treatment: 0    ASSESSMENT/Changes in Function: Pt presents with improved dorsiflexion mobility, but still has difficulty with eccentric control/form with descending stairs. Will continue to progress/advance patient within current POC as tolerated with monitoring symptoms. Patient will continue to benefit from skilled PT services to modify and progress therapeutic interventions, address functional mobility deficits, address ROM deficits, address strength deficits and analyze and cue movement patterns to attain remaining goals.      []  See Plan of Care  []  See progress note/recertification  []  See Discharge Summary         Progress towards goals / Updated goals:      PLAN  [x]  Upgrade activities as tolerated     []  Continue plan of care  []  Update interventions per flow sheet       []  Discharge due to:_  []  Other:_      Lino Chavez PTA 12/21/2018  8:54 AM

## 2018-12-27 ENCOUNTER — HOSPITAL ENCOUNTER (OUTPATIENT)
Dept: PHYSICAL THERAPY | Age: 38
End: 2018-12-27
Payer: COMMERCIAL

## 2018-12-31 ENCOUNTER — HOSPITAL ENCOUNTER (OUTPATIENT)
Dept: PHYSICAL THERAPY | Age: 38
Discharge: HOME | End: 2018-12-31
Payer: COMMERCIAL

## 2018-12-31 PROCEDURE — 97110 THERAPEUTIC EXERCISES: CPT

## 2018-12-31 NOTE — PROGRESS NOTES
PT DAILY TREATMENT NOTE  Patient Name: Lelo Carroll Date:2018 : 1980 [x]  Patient  Verified Payor: Yanni Rojas / Plan: [address] PT / Product Type: Commerical / In time:2:55  Out time:3:48 Total Treatment Time (min): 53 Total Timed Codes (min): 53 
1:1 Treatment Time (min):   
Visit #: 16 of 16 Treatment Area: Pain in right ankle [M25.571] SUBJECTIVE Pain Level (0-10 scale): 0 Any medication changes, allergies to medications, adverse drug reactions, diagnosis change, or new procedure performed?: [x] No    [] Yes (see summary sheet for update) Subjective functional status/changes:   [] No changes reported See Discharge Summary OBJECTIVE Modality rationale: Ice not performed today Type Additional Details  
[] Estim: []Att   []Unatt        []TENS instruct []IFC  []Premod   []NMES []Other:  []w/US   []w/ice   []w/heat Position: Location:  
[]  Traction: [] Cervical       []Lumbar 
                     [] Prone          []Supine []Intermittent   []Continuous Lbs: 
[] before manual 
[] after manual  
[]  Ultrasound: []Continuous   [] Pulsed []1MHz   []3MHz Location: 
W/cm2:  
[]  Iontophoresis with dexamethasone Location: [] Take home patch  
[] In clinic  
[]  Ice     []  heat 
[]  Ice massage Position: Location:  
[]  Vasopneumatic Device Pressure:       [] lo [] med [] hi  
Temperature: [] lo [] med [] hi  
[] Skin assessment post-treatment:  []intact []redness- no adverse reaction 
     []redness  adverse reaction:  
 
 
53 min Therapeutic Exercise:  [] See flow sheet :  
Rationale: increase strength, improve coordination, improve balance and increase proprioception to improve the patients ability to perform gait and other dynamic movement activities Included update and review of HEP prior to discharge Pain Level (0-10 scale) post treatment: 0 
 
 ASSESSMENT/Changes in Function:  
  
[]  See Plan of Care 
[]  See progress note/recertification 
[x]  See Discharge Summary PLAN 
[]  Upgrade activities as tolerated     []  Continue plan of care 
[]  Update interventions per flow sheet [x]  Discharge due to:_See discharge summary 
[]  Other:_ Chucky Avila 12/31/2018  3:01 PM

## 2018-12-31 NOTE — PROGRESS NOTES
[address] [facility] AT [address] 
[place] [address], [facility], [address]  Phone: [phone]   [phone] DISCHARGE SUMMARY Patient Name: Ana Goel : 1980 Treatment/Medical Diagnosis: Pain in right ankle [M25.571] Referral Source: Nika Silva DPM    
Date of Initial Visit: 10/19/18 Attended Visits: 16 Missed Visits: 2 SUMMARY OF TREATMENT Gait training, therapeutic exercise for ROM/strength, balance/proprioceptive training, manual therapy to right foot/ankle, pain modalities, HEP 
 
CURRENT STATUS Pt reports 80-85% overall improvement overall. She reports most ambulation is pain free. She is unable to return to wearing full heels yet, but has been able to walk in a low heeled shoe. Pt agrees she has met her rehab potential and is comfortable being discharged upon our review of her long term HEP. Right ankle AROM: +2 degrees DF, 50 degrees PF, 20 degrees INV, 8 degrees EV Strength: 4/5 INV/EV, 4/5 PF, 4+/5 DF · Short Term Goals: To be accomplished in  6  treatments: Independent/compliant with long term HEP for ROM, strength- Met and ongoing Improve active dorsiflexion ROM to 0 degrees or greater to improve gait- Met, +2 degrees Pt will be able to ambulate on even surface short/med distance with minimal gait deviation- Met, minimal deviation noted · Long Term Goals: To be accomplished in  12  treatments: 
Pt will report 75% improvement with distance walking on even surface- Not met, progressing Patient will be able to climb/descend 1 flight of stairs reciprocally with demonstration of good control/safety- Progressing, mild eccentric control deficits with descending stairs Able to stand unsupported on involved leg for 15-20 seconds to demonstrate balance and limb stability- Met, and patient able to perform on uneven/foam surface Improve FOTO outcome score by 19% to indicate a significant improvement in ADL function- Not met RECOMMENDATIONS Discharge from therapy. Patient has met or progressed to all goals. If you have any questions/comments please contact us directly at [phone]. Thank you for allowing us to assist in the care of your patient. Therapist Signature: Cynthia Atkinson PT, [facility] Date: 12/31/2018 Time: 2:23 PM  
NOTE TO PHYSICIAN:  PLEASE COMPLETE THE ORDERS BELOW AND FAX TO [facility] UNIT: [phone]. If you are unable to process this request in 24 hours please contact our office: [phone]. 
 
__ I have read the above report and request that my patient be discharged from therapy.   
 
Physician Signature:       Date:      Time:

## 2025-08-29 ENCOUNTER — HOSPITAL ENCOUNTER (EMERGENCY)
Age: 45
Discharge: HOME | End: 2025-08-29
Attending: STUDENT IN AN ORGANIZED HEALTH CARE EDUCATION/TRAINING PROGRAM
Payer: COMMERCIAL

## 2025-08-29 VITALS
OXYGEN SATURATION: 100 % | SYSTOLIC BLOOD PRESSURE: 105 MMHG | HEIGHT: 61 IN | TEMPERATURE: 98.3 F | DIASTOLIC BLOOD PRESSURE: 67 MMHG | BODY MASS INDEX: 33.04 KG/M2 | RESPIRATION RATE: 16 BRPM | HEART RATE: 81 BPM | WEIGHT: 175 LBS

## 2025-08-29 DIAGNOSIS — R10.84 GENERALIZED ABDOMINAL PAIN: Primary | ICD-10-CM

## 2025-08-29 LAB
ALBUMIN SERPL-MCNC: 4.1 G/DL (ref 3.4–5)
ALBUMIN/GLOB SERPL: 1.1 (ref 1–1.9)
ALP SERPL-CCNC: 98 U/L (ref 45–117)
ALT SERPL-CCNC: 25 U/L (ref 10–35)
ANION GAP SERPL CALC-SCNC: 11 MMOL/L (ref 7–16)
APPEARANCE UR: CLEAR
AST SERPL-CCNC: 21 U/L (ref 10–38)
BACTERIA URNS QL MICRO: NEGATIVE /HPF
BASOPHILS # BLD: 0.01 K/UL (ref 0–0.1)
BASOPHILS NFR BLD: 0.3 % (ref 0–2)
BILIRUB SERPL-MCNC: 0.3 MG/DL (ref 0.2–1)
BILIRUB UR QL: NEGATIVE
BUN SERPL-MCNC: 8 MG/DL (ref 6–23)
BUN/CREAT SERPL: 10
CALCIUM SERPL-MCNC: 10 MG/DL (ref 8.5–10.1)
CHLORIDE SERPL-SCNC: 101 MMOL/L (ref 98–107)
CO2 SERPL-SCNC: 25 MMOL/L (ref 21–32)
COLOR UR: YELLOW
CREAT SERPL-MCNC: 0.84 MG/DL (ref 0.6–1.3)
DIFFERENTIAL METHOD BLD: ABNORMAL
EKG ATRIAL RATE: 82 BPM
EKG DIAGNOSIS: NORMAL
EKG P AXIS: -1 DEGREES
EKG P-R INTERVAL: 126 MS
EKG Q-T INTERVAL: 330 MS
EKG QRS DURATION: 76 MS
EKG QTC CALCULATION (BAZETT): 385 MS
EKG R AXIS: -11 DEGREES
EKG T AXIS: -5 DEGREES
EKG VENTRICULAR RATE: 82 BPM
EOSINOPHIL # BLD: 0.09 K/UL (ref 0–0.4)
EOSINOPHIL NFR BLD: 2.4 % (ref 0–5)
ERYTHROCYTE [DISTWIDTH] IN BLOOD BY AUTOMATED COUNT: 13.5 % (ref 11.6–14.5)
GLOBULIN SER CALC-MCNC: 3.9 G/DL (ref 2.3–3.5)
GLUCOSE SERPL-MCNC: 83 MG/DL (ref 74–108)
GLUCOSE UR STRIP.AUTO-MCNC: NEGATIVE MG/DL
HCG UR QL: NEGATIVE
HCT VFR BLD AUTO: 43.9 % (ref 35–45)
HGB BLD-MCNC: 14.4 G/DL (ref 12–16)
HGB UR QL STRIP: NEGATIVE
IMM GRANULOCYTES # BLD AUTO: 0 K/UL (ref 0–0.04)
IMM GRANULOCYTES NFR BLD AUTO: 0 % (ref 0–0.5)
KETONES UR QL STRIP.AUTO: NEGATIVE MG/DL
LEUKOCYTE ESTERASE UR QL STRIP.AUTO: NEGATIVE
LIPASE SERPL-CCNC: 37 U/L (ref 13–75)
LYMPHOCYTES # BLD: 1.65 K/UL (ref 0.9–3.6)
LYMPHOCYTES NFR BLD: 44.6 % (ref 21–52)
MAGNESIUM SERPL-MCNC: 2.4 MG/DL (ref 1.6–2.6)
MCH RBC QN AUTO: 27.2 PG (ref 24–34)
MCHC RBC AUTO-ENTMCNC: 32.8 G/DL (ref 31–37)
MCV RBC AUTO: 83 FL (ref 78–100)
MONOCYTES # BLD: 0.34 K/UL (ref 0.05–1.2)
MONOCYTES NFR BLD: 9.2 % (ref 3–10)
NEUTS SEG # BLD: 1.61 K/UL (ref 1.8–8)
NEUTS SEG NFR BLD: 43.5 % (ref 40–73)
NITRITE UR QL STRIP.AUTO: NEGATIVE
NRBC # BLD: 0 K/UL (ref 0–0.01)
NRBC BLD-RTO: 0 PER 100 WBC
PH UR STRIP: 6 (ref 5–8)
PLATELET # BLD AUTO: 316 K/UL (ref 135–420)
PMV BLD AUTO: 9.8 FL (ref 9.2–11.8)
POTASSIUM SERPL-SCNC: 4.5 MMOL/L (ref 3.5–5.5)
PROT SERPL-MCNC: 8 G/DL (ref 6.4–8.2)
PROT UR STRIP-MCNC: NEGATIVE MG/DL
RBC # BLD AUTO: 5.29 M/UL (ref 4.2–5.3)
RBC #/AREA URNS HPF: NEGATIVE /HPF (ref 0–5)
SODIUM SERPL-SCNC: 137 MMOL/L (ref 136–145)
SP GR UR REFRACTOMETRY: 1.01 (ref 1–1.03)
TROPONIN T SERPL HS-MCNC: <6 NG/L (ref 0–14)
UROBILINOGEN UR QL STRIP.AUTO: 0.2 EU/DL (ref 0.2–1)
WBC # BLD AUTO: 3.7 K/UL (ref 4.6–13.2)
WBC URNS QL MICRO: NEGATIVE /HPF (ref 0–4)

## 2025-08-29 PROCEDURE — 81025 URINE PREGNANCY TEST: CPT

## 2025-08-29 PROCEDURE — 96374 THER/PROPH/DIAG INJ IV PUSH: CPT

## 2025-08-29 PROCEDURE — 83735 ASSAY OF MAGNESIUM: CPT

## 2025-08-29 PROCEDURE — 81001 URINALYSIS AUTO W/SCOPE: CPT

## 2025-08-29 PROCEDURE — 93005 ELECTROCARDIOGRAM TRACING: CPT

## 2025-08-29 PROCEDURE — 99284 EMERGENCY DEPT VISIT MOD MDM: CPT

## 2025-08-29 PROCEDURE — 84484 ASSAY OF TROPONIN QUANT: CPT

## 2025-08-29 PROCEDURE — 85025 COMPLETE CBC W/AUTO DIFF WBC: CPT

## 2025-08-29 PROCEDURE — 83690 ASSAY OF LIPASE: CPT

## 2025-08-29 PROCEDURE — 6360000002 HC RX W HCPCS: Performed by: STUDENT IN AN ORGANIZED HEALTH CARE EDUCATION/TRAINING PROGRAM

## 2025-08-29 PROCEDURE — 80053 COMPREHEN METABOLIC PANEL: CPT

## 2025-08-29 PROCEDURE — 2500000003 HC RX 250 WO HCPCS: Performed by: STUDENT IN AN ORGANIZED HEALTH CARE EDUCATION/TRAINING PROGRAM

## 2025-08-29 RX ORDER — DICYCLOMINE HCL 20 MG
20 TABLET ORAL 4 TIMES DAILY PRN
Qty: 30 TABLET | Refills: 0 | Status: SHIPPED | OUTPATIENT
Start: 2025-08-29

## 2025-08-29 RX ORDER — PANTOPRAZOLE SODIUM 40 MG/1
40 TABLET, DELAYED RELEASE ORAL
Qty: 30 TABLET | Refills: 0 | Status: SHIPPED | OUTPATIENT
Start: 2025-08-29

## 2025-08-29 RX ORDER — MAGNESIUM HYDROXIDE/ALUMINUM HYDROXICE/SIMETHICONE 120; 1200; 1200 MG/30ML; MG/30ML; MG/30ML
5 SUSPENSION ORAL EVERY 6 HOURS PRN
COMMUNITY

## 2025-08-29 RX ADMIN — SODIUM CHLORIDE, PRESERVATIVE FREE 20 MG: 5 INJECTION INTRAVENOUS at 14:14

## 2025-08-29 ASSESSMENT — PAIN SCALES - GENERAL: PAINLEVEL_OUTOF10: 4

## 2025-08-29 ASSESSMENT — PAIN DESCRIPTION - DESCRIPTORS: DESCRIPTORS: CRAMPING

## 2025-08-29 ASSESSMENT — PAIN DESCRIPTION - LOCATION: LOCATION: ABDOMEN

## 2025-08-29 ASSESSMENT — LIFESTYLE VARIABLES
HOW OFTEN DO YOU HAVE A DRINK CONTAINING ALCOHOL: MONTHLY OR LESS
HOW MANY STANDARD DRINKS CONTAINING ALCOHOL DO YOU HAVE ON A TYPICAL DAY: 1 OR 2